# Patient Record
Sex: MALE | Race: WHITE | NOT HISPANIC OR LATINO | Employment: FULL TIME | URBAN - METROPOLITAN AREA
[De-identification: names, ages, dates, MRNs, and addresses within clinical notes are randomized per-mention and may not be internally consistent; named-entity substitution may affect disease eponyms.]

---

## 2021-09-02 ENCOUNTER — APPOINTMENT (OUTPATIENT)
Dept: URGENT CARE | Facility: CLINIC | Age: 26
End: 2021-09-02
Payer: OTHER GOVERNMENT

## 2021-09-10 ENCOUNTER — E-VISIT (OUTPATIENT)
Dept: URGENT CARE | Facility: URGENT CARE | Age: 26
End: 2021-09-10
Payer: OTHER GOVERNMENT

## 2021-09-10 DIAGNOSIS — B07.0 PLANTAR WARTS: Primary | ICD-10-CM

## 2021-09-11 NOTE — PATIENT INSTRUCTIONS
Dear Chencho Beckman    It looks like your foot skin problem could be plantar warts. Plantar warts are caused by a virus. They usually need to be treated with a liquid nitrogen spray that is only available in the clinic. Please make an appointment to see family practice.    Thanks for choosing us as your health care partner,    Mattie Del Rosario PA-C  Dear Chencho Beckman?     After reviewing your responses, I am unable to make a diagnosis that can be treated online.    You will not be charged for this eVisit.     We are dedicated to helping you achieve your best health and would like to see you in one of our many clinic locations - a primary care provider would be ideal for your concern.    Please use [employer] to schedule a visit with a provider or call [phone]) to schedule at any of our locations.    Thanks for choosing?us?as your health care partner,?   ?   Mattie Del Rosario PA-C?

## 2021-09-28 ENCOUNTER — TELEPHONE (OUTPATIENT)
Dept: BEHAVIORAL HEALTH | Facility: CLINIC | Age: 26
End: 2021-09-28

## 2021-09-28 NOTE — TELEPHONE ENCOUNTER
Tried calling the pt to check him in for his 8am [facility] video visit and he did not answer. Writer left a vm requesting a call back.

## 2021-10-05 ENCOUNTER — HOSPITAL ENCOUNTER (OUTPATIENT)
Dept: BEHAVIORAL HEALTH | Facility: CLINIC | Age: 26
End: 2021-10-05
Attending: FAMILY MEDICINE
Payer: OTHER GOVERNMENT

## 2021-10-05 PROCEDURE — 999N000216 HC STATISTIC ADULT CD FACE TO FACE-NO CHRG: Mod: GT | Performed by: COUNSELOR

## 2021-10-05 ASSESSMENT — ANXIETY QUESTIONNAIRES
7. FEELING AFRAID AS IF SOMETHING AWFUL MIGHT HAPPEN: NOT AT ALL
GAD7 TOTAL SCORE: 2
6. BECOMING EASILY ANNOYED OR IRRITABLE: NOT AT ALL
GAD7 TOTAL SCORE: 2
7. FEELING AFRAID AS IF SOMETHING AWFUL MIGHT HAPPEN: NOT AT ALL
4. TROUBLE RELAXING: NOT AT ALL
GAD7 TOTAL SCORE: 2
1. FEELING NERVOUS, ANXIOUS, OR ON EDGE: NOT AT ALL
3. WORRYING TOO MUCH ABOUT DIFFERENT THINGS: SEVERAL DAYS
2. NOT BEING ABLE TO STOP OR CONTROL WORRYING: NOT AT ALL
8. IF YOU CHECKED OFF ANY PROBLEMS, HOW DIFFICULT HAVE THESE MADE IT FOR YOU TO DO YOUR WORK, TAKE CARE OF THINGS AT HOME, OR GET ALONG WITH OTHER PEOPLE?: NOT DIFFICULT AT ALL
5. BEING SO RESTLESS THAT IT IS HARD TO SIT STILL: SEVERAL DAYS

## 2021-10-05 ASSESSMENT — PATIENT HEALTH QUESTIONNAIRE - PHQ9
SUM OF ALL RESPONSES TO PHQ QUESTIONS 1-9: 0
SUM OF ALL RESPONSES TO PHQ QUESTIONS 1-9: 0
10. IF YOU CHECKED OFF ANY PROBLEMS, HOW DIFFICULT HAVE THESE PROBLEMS MADE IT FOR YOU TO DO YOUR WORK, TAKE CARE OF THINGS AT HOME, OR GET ALONG WITH OTHER PEOPLE: NOT DIFFICULT AT ALL
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
## 2021-10-06 ASSESSMENT — PATIENT HEALTH QUESTIONNAIRE - PHQ9: SUM OF ALL RESPONSES TO PHQ QUESTIONS 1-9: 0

## 2021-10-06 ASSESSMENT — ANXIETY QUESTIONNAIRES: GAD7 TOTAL SCORE: 2

## 2021-10-17 ENCOUNTER — HEALTH MAINTENANCE LETTER (OUTPATIENT)
Age: 26
End: 2021-10-17

## 2021-10-29 ENCOUNTER — E-VISIT (OUTPATIENT)
Dept: URGENT CARE | Facility: CLINIC | Age: 26
End: 2021-10-29
Payer: OTHER GOVERNMENT

## 2021-10-29 DIAGNOSIS — B35.4 TINEA CORPORIS: Primary | ICD-10-CM

## 2021-10-29 PROCEDURE — 99421 OL DIG E/M SVC 5-10 MIN: CPT | Performed by: PHYSICIAN ASSISTANT

## 2021-10-29 NOTE — PATIENT INSTRUCTIONS
"  Dear Chencho Beckman    After reviewing your responses, I've been able to diagnose you with \"tinea\" which is a common skin condition caused by a fungal infection. There are many common names for this depending on where it is located and it's appearance (jock itch, athlete's foot, ringworm, etc).  Based on your responses, I have prescribed lamisil to treat this. Please follow the instructions on the medication. If you experience irritation of your skin, new rash, or any other new symptoms, you should stop using this medication and contact your primary care provider.  If this treatment does not work for you in 2 weeks or after completing treatment, please plan to follow-up with your primary care provider to evaluate in-person.  Self-care:  Wash all items that come into contact with infected skin: Wash all towels, clothes, and bedding in hot water. Use laundry soap. Clean shower stalls, mats, and floors with a germ-killing or fungus-killing .   Do not share personal items: Do not share towels, brushes, elena, or hair accessories.    Keep your skin, hair, and nails clean and dry: Bathe every day, and dry your skin before you put medicine on the infected area. Wash your hands often. Do not scratch your sores. This may cause the infection to spread.   Avoid infected pets: A patch of missing fur is a sign of infection in a pet. Take your infected pet to a  for treatment.  Contact your primary healthcare provider if:  You have a fever.    Your infection continues to spread after 7 days of treatment.   Your rash is not gone in 2 weeks.   The area around your rash becomes red, warm, tender, swollen, or smells bad.   You have questions or concerns about your condition or care.    Thanks for choosing?us?as your health care partner,    Lulu Ruiz PA-C  "

## 2021-12-06 ENCOUNTER — APPOINTMENT (OUTPATIENT)
Dept: URGENT CARE | Facility: CLINIC | Age: 26
End: 2021-12-06
Payer: OTHER GOVERNMENT

## 2021-12-11 ENCOUNTER — E-VISIT (OUTPATIENT)
Dept: INTERNAL MEDICINE | Facility: CLINIC | Age: 26
End: 2021-12-11
Payer: OTHER GOVERNMENT

## 2021-12-11 DIAGNOSIS — B35.4 TINEA CORPORIS: Primary | ICD-10-CM

## 2021-12-11 PROCEDURE — 99207 PR NO BILLABLE SERVICE THIS VISIT: CPT | Performed by: PHYSICIAN ASSISTANT

## 2021-12-11 RX ORDER — KETOCONAZOLE 20 MG/G
CREAM TOPICAL 2 TIMES DAILY
Qty: 60 G | Refills: 0 | Status: SHIPPED | OUTPATIENT
Start: 2021-12-11 | End: 2021-12-25

## 2021-12-28 ENCOUNTER — E-VISIT (OUTPATIENT)
Dept: URGENT CARE | Facility: URGENT CARE | Age: 26
End: 2021-12-28
Payer: OTHER GOVERNMENT

## 2021-12-28 ENCOUNTER — E-VISIT (OUTPATIENT)
Dept: URGENT CARE | Facility: URGENT CARE | Age: 26
End: 2021-12-28

## 2021-12-28 ENCOUNTER — NURSE TRIAGE (OUTPATIENT)
Dept: NURSING | Facility: CLINIC | Age: 26
End: 2021-12-28

## 2021-12-28 DIAGNOSIS — L20.9 ATOPIC DERMATITIS, UNSPECIFIED TYPE: Primary | ICD-10-CM

## 2021-12-28 DIAGNOSIS — L24.9 IRRITANT CONTACT DERMATITIS, UNSPECIFIED TRIGGER: Primary | ICD-10-CM

## 2021-12-28 PROCEDURE — 99421 OL DIG E/M SVC 5-10 MIN: CPT | Performed by: PHYSICIAN ASSISTANT

## 2021-12-28 PROCEDURE — 99207 PR NON-BILLABLE SERV PER CHARTING: CPT | Performed by: PHYSICIAN ASSISTANT

## 2021-12-28 RX ORDER — TRIAMCINOLONE ACETONIDE 1 MG/G
OINTMENT TOPICAL 2 TIMES DAILY
Qty: 30 G | Refills: 1 | Status: SHIPPED | OUTPATIENT
Start: 2021-12-28 | End: 2022-03-22

## 2021-12-28 RX ORDER — DESONIDE 0.5 MG/G
CREAM TOPICAL 2 TIMES DAILY
Qty: 60 G | Refills: 1 | Status: SHIPPED | OUTPATIENT
Start: 2021-12-28 | End: 2022-03-22

## 2021-12-29 ENCOUNTER — OFFICE VISIT (OUTPATIENT)
Dept: FAMILY MEDICINE | Facility: CLINIC | Age: 26
End: 2021-12-29
Payer: OTHER GOVERNMENT

## 2021-12-29 VITALS
DIASTOLIC BLOOD PRESSURE: 74 MMHG | HEART RATE: 82 BPM | SYSTOLIC BLOOD PRESSURE: 130 MMHG | RESPIRATION RATE: 16 BRPM | TEMPERATURE: 98.4 F | OXYGEN SATURATION: 96 % | WEIGHT: 139 LBS

## 2021-12-29 DIAGNOSIS — B35.9 RINGWORM: Primary | ICD-10-CM

## 2021-12-29 PROCEDURE — 99213 OFFICE O/P EST LOW 20 MIN: CPT | Performed by: EMERGENCY MEDICINE

## 2021-12-29 RX ORDER — TERBINAFINE HYDROCHLORIDE 250 MG/1
250 TABLET ORAL DAILY
Qty: 7 TABLET | Refills: 1 | Status: SHIPPED | OUTPATIENT
Start: 2021-12-29 | End: 2022-01-05

## 2021-12-29 NOTE — PROGRESS NOTES
Impression:  Ringworm left index finger unresponsive to topical treatment    Plan:  Terbinafine 250 mg daily for 7 days, repeat 1 additional week if not effective, follow-up with primary care as needed      Chief Complaint:  Patient presents with:  Rash: possible ring worm left index finger has been on going has been given several creams never fully goes away looking for oral medication         HPI:   Chencho Beckman is a 26 year old male who presents to this clinic for the evaluation of ringworm on the left index finger.  Patient complains of several weeks of a rash on the dorsal aspect of the proximal phalanx of the left index finger.  He was diagnosed with ringworm and has been given 2 different types of topical treatment but neither 1 has resulted in a response and he requests an oral medication.  No other rash.  No other medical problems      PMH:   No past medical history on file.  No past surgical history on file.      ROS:  All other systems negative    Meds:    Current Outpatient Medications:      terbinafine (LAMISIL) 1 % external cream, Apply topically 2 times daily, Disp: 30 g, Rfl: 1     desonide (DESOWEN) 0.05 % external cream, Apply topically 2 times daily (Patient not taking: Reported on 12/29/2021), Disp: 60 g, Rfl: 1     triamcinolone (KENALOG) 0.1 % external ointment, Apply topically 2 times daily (Patient not taking: Reported on 12/29/2021), Disp: 30 g, Rfl: 1        Social:  Social History     Socioeconomic History     Marital status: Single     Spouse name: Not on file     Number of children: Not on file     Years of education: Not on file     Highest education level: Not on file   Occupational History     Not on file   Tobacco Use     Smoking status: Never Smoker     Smokeless tobacco: Never Used   Substance and Sexual Activity     Alcohol use: Not on file     Drug use: Not on file     Sexual activity: Not on file   Other Topics Concern     Not on file   Social History Narrative     Not on  file     Social Determinants of Health     Financial Resource Strain: Not on file   Food Insecurity: Not on file   Transportation Needs: Not on file   Physical Activity: Not on file   Stress: Not on file   Social Connections: Not on file   Intimate Partner Violence: Not on file   Housing Stability: Not on file         Physical Exam:  Vitals:    12/29/21 1459   BP: 130/74   Pulse: 82   Resp: 16   Temp: 98.4  F (36.9  C)   TempSrc: Oral   SpO2: 96%   Weight: 63 kg (139 lb)      Circular area of vague erythema with central clearing over the dorsal aspect of the left index finger over the proximal phalanx.  No other lesions      Results:    No results found for this or any previous visit (from the past 24 hour(s)).           Charles David MD

## 2021-12-29 NOTE — PATIENT INSTRUCTIONS
Patient Education     Ringworm of the Skin     Ringworm is a fungal infection of the skin. Despite the name, a worm doesn't cause it. The cause of ringworm is a fungus that infects the outer layers of the skin.  The medical term for ringworm is tinea. It can affect most parts of your body, although it seems to do better in moist areas of the body and around hair. It can be on almost any part of your body, including:    Arms, hands, legs, chest, feet, and back    Scalp    Beard    Groin    Between the toes  Depending on where it is located, sometimes the name changes. For example:    Tinea capitis (scalp)    Tinea cruris (groin)    Tinea corporis (body)    Tinea pedis (feet)  Causes  Ringworm is very common all over the world, including the [place] It can take less than 1 week up to 2 weeks before you develop the infection after being exposed. So, you may not figure out the exact cause.  It's spread through direct contact with:    An infected person or animal    Infected soil, or objects such as towels, clothing, and elena  Symptoms  At first you might not notice ringworm. Or you may just see a small, red, often raised itchy spot or pimple. Sometimes there may only be one spot. At other times there may be several. Ringworm can look slightly different on different parts of the body, but there are some things are always present:    Irregular, round, oval or ring-shaped, which is why it's called ringworm    Clearer or lighter color at the center, since it spreads from the center of the spot outward    Red or inflamed look    Raised    Itchy    Scaly, dry, or flaky  Home care  Follow these tips to help care for yourself at home:    Leave it alone. Don't scratch at the rash or pick it. This can increase the chance of infection and scarring.    Take medicine as prescribed. If you were prescribed a cream, apply it exactly as directed. Make sure to put the cream not just on the rash, but also on the skin 1 or 2 inches around  it. Medicine by mouth is sometimes needed, particularly for ringworm on the scalp. Take it as directed and until your healthcare provider says to stop. Some ringworm creams are now available without a prescription (over the counter). Talk with your healthcare provider about these, as they may be just as effective but less expensive than prescription medicines in some cases.    Keep it from spreading to others.  Untreated ringworm of the skin is contagious by skin-to-skin contact. An affected child may return to school 2 days after treatment has started.  Prevention  To some degree, prevention depends on what part of your body was affected. In general, the following good hygiene can help.    Clean up after you get dirty or sweaty, or after using a locker room.    When possible, don t share elena and brushes.    Avoid having your skin and feet wet or damp for long periods.    Wear clean, loose-fitting underwear.  Follow-up care  Follow up with your healthcare provider as advised by our staff if the rash does not improve after 10 days of treatment or if the rash spreads to other areas of the body.  When to seek medical care  Call your healthcare provider right away if any of these occur:    Redness around the rash gets worse    Fluid drains from the rash    Fever of 100.4 F (38 C) or higher, or as directed by your healthcare provider  Olamide last reviewed this educational content on 8/1/2019 2000-2021 [employer]. All rights reserved. This information is not intended as a substitute for professional medical care. Always follow your healthcare professional's instructions.

## 2021-12-29 NOTE — TELEPHONE ENCOUNTER
Patient called.  He has had 3 evisits due to ringworm.  Patient states he has tried the cream and it is not helping.  He is requesting a oral medication.  Plan will be for the patient to go to the walk in clinic in [place] today.  Address given to patient of [facility] walk in [facility].        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

## 2022-01-13 ENCOUNTER — VIRTUAL VISIT (OUTPATIENT)
Dept: FAMILY MEDICINE | Facility: CLINIC | Age: 27
End: 2022-01-13
Payer: OTHER GOVERNMENT

## 2022-01-13 DIAGNOSIS — L98.9 SKIN LESION: Primary | ICD-10-CM

## 2022-01-13 PROCEDURE — 99203 OFFICE O/P NEW LOW 30 MIN: CPT | Mod: 95 | Performed by: FAMILY MEDICINE

## 2022-01-13 NOTE — PROGRESS NOTES
Ty is a 26 year old who is being evaluated via a billable video visit.      How would you like to obtain your AVS? MyChart  If the video visit is dropped, the invitation should be resent by: Text to cell phone: [phone]  Will anyone else be joining your video visit? No      Video Start Time: 3:13 PM    Assessment & Plan     Skin lesion  Patient presents to clinic several weeks after discovery of an erythematous and pruritic lesion/rash on his index finger not responsive to topical ketoconazole and oral terbinafine. I do not suspect that this is ringworm given the duration of symptoms resistant to treatment. Would consider psoriasis as an alternative diagnosis given the flares after swimming. Recommended stopping antifungal treatment, watching waiting, treatment with otc hydrocortisone if it flares again, and referral to derm if that is insufficient.            No follow-ups on file.    Maria Luisa Britt MD  [facility]    Subjective   Ty is a 26 year old who presents for the following health issues     HPI     Follow up on skin condition:  - patient is concerned he has ringworm  - flares with swimming and hot water use  - never had anything like this before  - patient has already been taking oral terbinafine and topical ketoconazole for several weeks        Objective           Vitals:  No vitals were obtained today due to virtual visit.    Physical Exam   Gen: NAD  Psych: Normal affect, no hallucinations or delusions, not tearful  Skin: challenging to appreciate in this virtual medium, no obvious scaling or demarcation for the lesion          Video-Visit Details    Type of service:  Video Visit    Video End Time:3:23 PM    Originating Location (pt. Location): Home    Distant Location (provider location):  [facility]     Platform used for Video Visit: F[employer]io

## 2022-01-14 DIAGNOSIS — L98.9 SKIN LESION: Primary | ICD-10-CM

## 2022-01-14 NOTE — TELEPHONE ENCOUNTER
I can help place referral to dermatology for further evaluation if needed, but cannot recommend that we resend a topical antifungal.  Patient was seen by primary care yesterday and there is low concern for fungal involvement.  Also, walk-in care clinic does not typically perform refills.

## 2022-01-14 NOTE — TELEPHONE ENCOUNTER
"Pt calling to schedule a dermatology appt for potential ringworm following a 12/29/21 visit with Dr. David and was transferred to triage. Informed pt that writer does not see a dermatology referral, but can pend this order and send it to physician.      Pt also states he has requested a refill of terbinafine from the clinic but that they have been \"unresponsive.\"      Dermatology referral pended.  Terbinafine refill pended.  Forwarded to Joann and [facility].    Caller asked to be sent a copy of this note via 2 Minutes and this was done.    Marci PAGE RN  [facility]            "

## 2022-03-21 ENCOUNTER — DOCUMENTATION ONLY (OUTPATIENT)
Dept: LAB | Facility: CLINIC | Age: 27
End: 2022-03-21
Payer: OTHER GOVERNMENT

## 2022-03-21 NOTE — PROGRESS NOTES
Chencho Beckman has an upcoming lab appointment:    Future Appointments   Date Time Provider Department Center   3/22/2022  8:45 AM [facility]     Patient is scheduled for the following lab(s): Blood work. Patient's note- Food Allergy/Senitivity; Abdominal pain, indigestion, and heart rate spike after eating; Malaise, weakness, appetite swings.    There is no order available. Please review and place either future orders or HMPO (Review of Health Maintenance Protocol Orders), as appropriate.    Health Maintenance Due   Topic     ANNUAL REVIEW OF HM ORDERS      HIV SCREENING      HEPATITIS C SCREENING      Luis Nix

## 2022-03-22 ENCOUNTER — OFFICE VISIT (OUTPATIENT)
Dept: FAMILY MEDICINE | Facility: CLINIC | Age: 27
End: 2022-03-22
Payer: OTHER GOVERNMENT

## 2022-03-22 ENCOUNTER — E-VISIT (OUTPATIENT)
Dept: URGENT CARE | Facility: URGENT CARE | Age: 27
End: 2022-03-22
Payer: OTHER GOVERNMENT

## 2022-03-22 VITALS
SYSTOLIC BLOOD PRESSURE: 121 MMHG | OXYGEN SATURATION: 100 % | WEIGHT: 138.8 LBS | DIASTOLIC BLOOD PRESSURE: 62 MMHG | BODY MASS INDEX: 23.7 KG/M2 | HEIGHT: 64 IN | TEMPERATURE: 98.8 F | HEART RATE: 80 BPM

## 2022-03-22 DIAGNOSIS — F41.9 ANXIETY: ICD-10-CM

## 2022-03-22 DIAGNOSIS — R14.0 BLOATING: Primary | ICD-10-CM

## 2022-03-22 DIAGNOSIS — Z11.4 SCREENING FOR HIV (HUMAN IMMUNODEFICIENCY VIRUS): ICD-10-CM

## 2022-03-22 DIAGNOSIS — Z11.59 NEED FOR HEPATITIS C SCREENING TEST: ICD-10-CM

## 2022-03-22 DIAGNOSIS — R53.1 WEAKNESS: Primary | ICD-10-CM

## 2022-03-22 DIAGNOSIS — Z23 NEED FOR VACCINATION: ICD-10-CM

## 2022-03-22 LAB
ALBUMIN SERPL-MCNC: 4.2 G/DL (ref 3.4–5)
ALBUMIN UR-MCNC: NEGATIVE MG/DL
ALP SERPL-CCNC: 69 U/L (ref 40–150)
ALT SERPL W P-5'-P-CCNC: 37 U/L (ref 0–70)
ANION GAP SERPL CALCULATED.3IONS-SCNC: 1 MMOL/L (ref 3–14)
APPEARANCE UR: CLEAR
AST SERPL W P-5'-P-CCNC: 24 U/L (ref 0–45)
BILIRUB SERPL-MCNC: 0.6 MG/DL (ref 0.2–1.3)
BILIRUB UR QL STRIP: NEGATIVE
BUN SERPL-MCNC: 14 MG/DL (ref 7–30)
CALCIUM SERPL-MCNC: 9.1 MG/DL (ref 8.5–10.1)
CHLORIDE BLD-SCNC: 106 MMOL/L (ref 94–109)
CO2 SERPL-SCNC: 31 MMOL/L (ref 20–32)
COLOR UR AUTO: YELLOW
CREAT SERPL-MCNC: 0.96 MG/DL (ref 0.66–1.25)
ERYTHROCYTE [DISTWIDTH] IN BLOOD BY AUTOMATED COUNT: 11.9 % (ref 10–15)
GFR SERPL CREATININE-BSD FRML MDRD: >90 ML/MIN/1.73M2
GLUCOSE BLD-MCNC: 108 MG/DL (ref 70–99)
GLUCOSE UR STRIP-MCNC: NEGATIVE MG/DL
HCT VFR BLD AUTO: 46.7 % (ref 40–53)
HGB BLD-MCNC: 16.7 G/DL (ref 13.3–17.7)
HGB UR QL STRIP: NEGATIVE
KETONES UR STRIP-MCNC: NEGATIVE MG/DL
LEUKOCYTE ESTERASE UR QL STRIP: NEGATIVE
MCH RBC QN AUTO: 32.1 PG (ref 26.5–33)
MCHC RBC AUTO-ENTMCNC: 35.8 G/DL (ref 31.5–36.5)
MCV RBC AUTO: 90 FL (ref 78–100)
NITRATE UR QL: NEGATIVE
PH UR STRIP: 7.5 [PH] (ref 5–7)
PLATELET # BLD AUTO: 199 10E3/UL (ref 150–450)
POTASSIUM BLD-SCNC: 4.4 MMOL/L (ref 3.4–5.3)
PROT SERPL-MCNC: 7.3 G/DL (ref 6.8–8.8)
RBC # BLD AUTO: 5.21 10E6/UL (ref 4.4–5.9)
SODIUM SERPL-SCNC: 138 MMOL/L (ref 133–144)
SP GR UR STRIP: 1.02 (ref 1–1.03)
UROBILINOGEN UR STRIP-ACNC: 0.2 E.U./DL
WBC # BLD AUTO: 4.2 10E3/UL (ref 4–11)

## 2022-03-22 PROCEDURE — 85027 COMPLETE CBC AUTOMATED: CPT | Performed by: FAMILY MEDICINE

## 2022-03-22 PROCEDURE — 80053 COMPREHEN METABOLIC PANEL: CPT | Performed by: FAMILY MEDICINE

## 2022-03-22 PROCEDURE — 81003 URINALYSIS AUTO W/O SCOPE: CPT | Performed by: FAMILY MEDICINE

## 2022-03-22 PROCEDURE — 99214 OFFICE O/P EST MOD 30 MIN: CPT | Performed by: FAMILY MEDICINE

## 2022-03-22 PROCEDURE — 86803 HEPATITIS C AB TEST: CPT | Performed by: FAMILY MEDICINE

## 2022-03-22 PROCEDURE — 36415 COLL VENOUS BLD VENIPUNCTURE: CPT | Performed by: FAMILY MEDICINE

## 2022-03-22 PROCEDURE — 87389 HIV-1 AG W/HIV-1&-2 AB AG IA: CPT | Performed by: FAMILY MEDICINE

## 2022-03-22 NOTE — PROGRESS NOTES
"  Assessment & Plan      New patient to me   Repetitive, perseverating, interrupting me  Very focused on needing more lab testing - doing an out of pocket internet work up \"male panel\"   Agrees that he is highly anxious but gets very annoyed when I suggest meds to help. \"of course you want to throw meds at the problem\"  Says that meds have never helped anxiety/depression.   Wants me to order food sensitivity testing today       Bloating  Vague symptoms. No red flag symptoms at all. Tried to reassure him but he seems frustrated that I won't order \"food sensitivity\" panels. Will check basic abd labs and consider further work up if there is anything that shows up in the labwork.   - Comprehensive metabolic panel (BMP + Alb, Alk Phos, ALT, AST, Total. Bili, TP); Future  - CBC with platelets; Future  - UA Macro with Reflex to Micro and Culture - lab collect; Future  - Adult Allergy/Asthma Referral; Future    Anxiety   Refuses any help with this  Says meds don't work     Screening for HIV (human immunodeficiency virus)  Discussed   - HIV Antigen Antibody Combo; Future    Need for hepatitis C screening test  Discussed   - Hepatitis C Screen Reflex to HCV RNA Quant and Genotype; Future      Return in about 4 weeks (around 4/19/2022) for with specialist or sooner if needed.    Chacha Olvera MD  [facility] P[facility] Crawford is a 26 year old who presents for the following health issues    New patient       Answers for HPI/ROS submitted by the patient on 3/22/2022  How many servings of fruits and vegetables do you eat daily?: 2-3  On average, how many sweetened beverages do you drink each day (Examples: soda, juice, sweet tea, etc.  Do NOT count diet or artificially sweetened beverages)?: 0  How many minutes a day do you exercise enough to make your heart beat faster?: 30 to 60  How many days a week do you exercise enough to make your heart beat faster?: 5  How many days per week do you miss taking " "your medication?: 0        What is the reason for your visit today?: Gut issues  When did your symptoms begin?: 5 days ago  What are your symptoms?: Lower abdominal pain/burning, bloating/distended gut, loss of appetite,  indigestion  How would you describe these symptoms?: Mild  Are your symptoms:: Staying the same  Have you had these symptoms before?: Yes  Have you tried or received treatment for these symptoms before?: No  Is there anything that makes you feel worse?: Eating  Is there anything that makes you feel better?: Very restricted diet      Mild bloating symptoms started 5 days ago  Then had to do a large amount of physical activity the next day and felt worse afterwards    Then reports that lower abdominal bloating and mild discomfort started two days ago   - stable   Only with eating   Bloated in the lower abdomen   Some diarrhea after his intense exercise, none now   Normal stools - no black/tarry/blood stools   Appetite is decreased   No n/v    Refuses std testing - denies symptoms     Has lactose intolerance - takes lactase     Says he has a gluten sensitivities - eating normally until this episode occurred     Worried about norovirus - has a fear of vomiting  A friend's girlfriend recently had it so he is worried that he might have it.     Says \"YES\" he has anxiety    Sweaty hands and racing heart       Review of systems:   No fever chills  No current chest pain  No shortness of breath/cough  No n/v  No diarrhea/constipation   No black/tarry/bloody stools.   No dysuria/urgency/frequency      Doing a \"male panel\" - a full set of labs from an internet website site - says it costs thousands of dollars but does a better job at looking at the \"whole body\" than we do in clinic.     References some study about how most women like 10% of the men     Taking ajith, turmeric, other things to help his immune system     Review of his chart :     No medical diagnoses, per his report.   In epic, has been diagnosed " "with depression, and has other visits for abd pain.     1/2020 - abd pain visit - sent to GI  - I don't see a visit     Review of Systems   Constitutional, HEENT, cardiovascular, pulmonary, gi and gu systems are negative, except as otherwise noted.      Objective    /62   Pulse 80   Temp 98.8  F (37.1  C) (Tympanic)   Ht 1.632 m (5' 4.25\")   Wt 63 kg (138 lb 12.8 oz)   SpO2 100%   BMI 23.64 kg/m    Body mass index is 23.64 kg/m .  Physical Exam   GENERAL: healthy, alert and no distress, minimal eye contact.   EYES: Eyes grossly normal to inspection, PERRL and conjunctivae and sclerae normal  HENT: ear canals and TM's normal, nose and mouth without ulcers or lesions  NECK: no adenopathy, no asymmetry, masses, or scars and thyroid normal to palpation  RESP: lungs clear to auscultation - no rales, rhonchi or wheezes  CV: regular rate and rhythm, normal S1 S2, no S3 or S4, no murmur, click or rub, no peripheral edema and peripheral pulses strong  ABDOMEN: soft, nontender, no hepatosplenomegaly, no masses and bowel sounds normal  MS: no gross musculoskeletal defects noted, no edema  SKIN: no suspicious lesions or rashes  NEURO: Normal strength and tone, mentation intact and speech normal  PSYCH: mentation appears somewhat compulsive and perseverating around health/wellness/fitness          "

## 2022-03-22 NOTE — PATIENT INSTRUCTIONS
Dear Chencho Beckman    I would recommend you being seen at the primary clinic or urgent care for further evaluation and treatment     Thanks for choosing us as your health care partner,    Karishma Roman MD

## 2022-03-23 LAB
HCV AB SERPL QL IA: NONREACTIVE
HIV 1+2 AB+HIV1 P24 AG SERPL QL IA: NONREACTIVE

## 2022-10-03 ENCOUNTER — HEALTH MAINTENANCE LETTER (OUTPATIENT)
Age: 27
End: 2022-10-03

## 2023-02-11 ENCOUNTER — HEALTH MAINTENANCE LETTER (OUTPATIENT)
Age: 28
End: 2023-02-11

## 2023-04-05 ENCOUNTER — E-VISIT (OUTPATIENT)
Dept: FAMILY MEDICINE | Facility: CLINIC | Age: 28
End: 2023-04-05

## 2023-04-05 DIAGNOSIS — F41.9 ANXIETY: Primary | ICD-10-CM

## 2023-04-05 PROCEDURE — 99207 PR NON-BILLABLE SERV PER CHARTING: CPT | Performed by: FAMILY MEDICINE

## 2023-04-05 ASSESSMENT — ANXIETY QUESTIONNAIRES
GAD7 TOTAL SCORE: 3
6. BECOMING EASILY ANNOYED OR IRRITABLE: SEVERAL DAYS
1. FEELING NERVOUS, ANXIOUS, OR ON EDGE: SEVERAL DAYS
8. IF YOU CHECKED OFF ANY PROBLEMS, HOW DIFFICULT HAVE THESE MADE IT FOR YOU TO DO YOUR WORK, TAKE CARE OF THINGS AT HOME, OR GET ALONG WITH OTHER PEOPLE?: SOMEWHAT DIFFICULT
3. WORRYING TOO MUCH ABOUT DIFFERENT THINGS: NOT AT ALL
4. TROUBLE RELAXING: NOT AT ALL
7. FEELING AFRAID AS IF SOMETHING AWFUL MIGHT HAPPEN: SEVERAL DAYS
7. FEELING AFRAID AS IF SOMETHING AWFUL MIGHT HAPPEN: SEVERAL DAYS
2. NOT BEING ABLE TO STOP OR CONTROL WORRYING: NOT AT ALL
5. BEING SO RESTLESS THAT IT IS HARD TO SIT STILL: NOT AT ALL
GAD7 TOTAL SCORE: 3
GAD7 TOTAL SCORE: 3

## 2023-04-05 ASSESSMENT — PATIENT HEALTH QUESTIONNAIRE - PHQ9
10. IF YOU CHECKED OFF ANY PROBLEMS, HOW DIFFICULT HAVE THESE PROBLEMS MADE IT FOR YOU TO DO YOUR WORK, TAKE CARE OF THINGS AT HOME, OR GET ALONG WITH OTHER PEOPLE: SOMEWHAT DIFFICULT
SUM OF ALL RESPONSES TO PHQ QUESTIONS 1-9: 13
SUM OF ALL RESPONSES TO PHQ QUESTIONS 1-9: 13

## 2023-04-05 NOTE — TELEPHONE ENCOUNTER
DIAGNOSIS: Both Elbows   APPOINTMENT DATE: 4.11.23   NOTES STATUS DETAILS   OFFICE NOTE from referring provider Internal Self referred

## 2023-04-06 ASSESSMENT — ANXIETY QUESTIONNAIRES: GAD7 TOTAL SCORE: 3

## 2023-04-06 ASSESSMENT — PATIENT HEALTH QUESTIONNAIRE - PHQ9: SUM OF ALL RESPONSES TO PHQ QUESTIONS 1-9: 13

## 2023-04-06 NOTE — TELEPHONE ENCOUNTER
Call placed to patient  Relayed Dr. Olvera's message    Patient denies active plan to self harm  States he feels safe in his home  Good support from friends - has a complicated relationship with family     Patient wondering if symptoms are related to lab levels being off and would like labs checked    Patient transferred to schedule an in person visit at a clinic closet to him   Patient verbalized understanding  No further questions/concerns    Joshua Cisneros RN

## 2023-04-06 NOTE — TELEPHONE ENCOUNTER
Provider E-Visit time total (minutes): 6    Clinic RN - please call patient. He reported daily self harm thoughts on his evisit questions. Please assess for safety.     He needs an in person appointment. I have only seen him once before.  Please see if there are any appts in the next couple days with any provider/clinic he is willing to see and schedule him.    Thanks, DEBI Olvera MD

## 2023-04-11 ENCOUNTER — PRE VISIT (OUTPATIENT)
Dept: ORTHOPEDICS | Facility: CLINIC | Age: 28
End: 2023-04-11
Payer: OTHER GOVERNMENT

## 2023-04-11 ENCOUNTER — OFFICE VISIT (OUTPATIENT)
Dept: ORTHOPEDICS | Facility: CLINIC | Age: 28
End: 2023-04-11
Payer: OTHER GOVERNMENT

## 2023-04-11 DIAGNOSIS — M25.522 BILATERAL ELBOW JOINT PAIN: Primary | ICD-10-CM

## 2023-04-11 DIAGNOSIS — M25.521 BILATERAL ELBOW JOINT PAIN: Primary | ICD-10-CM

## 2023-04-11 PROCEDURE — 99203 OFFICE O/P NEW LOW 30 MIN: CPT | Performed by: FAMILY MEDICINE

## 2023-04-11 NOTE — PATIENT INSTRUCTIONS
"Medial Epicondylitis    Golfer s Elbow Instructions    MEDIAL EPICONDYLITIS (AKA GOLFER'S ELBOW):  -pain on the inside of elbow worsened with any gripping or lifting activity  -weakness of   -aching or burning pain  -Usually there is not any numbness or tingling involved (please let us know if this is happening as it may be something else)    TREATMENT:  -Avoid aggravating activities until pain free at rest and with exercises. Then return slowly with pain being your guide. IF IT HURTS, DO NOT DO IT!  -wrist brace allows the muscles to relax in neutral position. The muscles that extend and rotate your wrist are attached at the lateral elbow.It is these tendons that are painful so the wrist brace protects them (wear brace 24hours/day until pain free then may wean out except for activities)  - \"chopat\" elbow strap relieves pressure on the tendon attachment but does squeeze muscle so may initially hurt. Often helpful once resuming activities and not using wrist brace.  -Ice 10-15 minutes after activity. (or ice cup massage 5-7 min)  -cross-friction massage (rub painful area)  -Take Aleve (220mg) 2 tabs twice daily with food for 14 days to decrease inflammation, then as needed for pain.    HOME EXERCISES:  -Perform exercises as instructed in handout:  Goal:  2 sets of 20 for each strength exercise   1-2 times per day   5 days a week   Stretch after strengthening- Hold stretches for 30 secs, repeat 2-3 times   Avoid stretching through pain  -An excellent therapy program for epicondylitis is called \"Reverse Vince Twist\"  - you may do this through formal therapy or on your own.  -To do at home, purchase a flex  bar by [employer] online. there are different strengths so adjust according to your fitness level and pain. The bar may be purchased online as well at sites such as amazon.  - Search online for \"Reverse Vince twist elbow exercises\" and you will find videos on how to perform.  -Follow-up in 6 weeks or sooner " if not improved, unable to do exercises do to pain, or if further concern.  -If not improving, we need to make sure the diagnosis is correct. Occasionally, elbow pain is due to nerve irritation.    -Once diagnosis is confirmed, we may consider nitroglycerin patch (see below for info), formal occupational therapy if not started, lidocaine injection to stimulate healing response (tenotomy), or other therapy modalities.  -We will try to avoid cortisone injections as they may delay healing but will consider if pain prevents improvement with other modalities  -rarely do you need surgery to alleviate pain    Other treatment options include:  -consider Active release therapy with a chiropractor.  For more information on ART check www.activerelease.Contrib.  -Therapeutic massage    If problem flares again after resolved, restart brace full-time, restart icing, Aleve, and exercises. If it does not calm down, schedule follow up evaluation.    You may do the stretching exercises right away. You may do the strengthening exercises when stretching is nearly painless.    STRETCHING EXERCISES  Wrist active range of motion, flexion and extension: Bend the wrist of your injured arm forward and back as far as you can. Do 2 sets of 15.    Wrist stretch: Press the back of the hand on your injured side with your other hand to help bend your wrist. Hold for 15 to 30 seconds. Next, stretch the hand back by pressing the fingers in a backward direction. Hold for 15 to 30 seconds. Keep the arm on your injured side straight during this exercise. Do 3 sets.    Forearm pronation and supination: Bend the elbow of your injured arm 90 degrees, keeping your elbow at your side. Turn your palm up and hold for 5 seconds. Then slowly turn your palm down and hold for 5 seconds. Make sure you keep your elbow at your side and bent 90 degrees while you do the exercise. Do 2 sets of 15.    STRENGTHENING EXERCISES  Eccentric wrist flexion: Hold a can or hammer  handle in the hand of your injured side with your palm up. Use the hand on the side that is not injured to bend your wrist up. Then let go of your wrist and use just your injured side to lower the weight slowly back to the starting position. Do 3 sets of 15. Gradually increase the weight you are holding.    Eccentric wrist extension: Hold a soup can or hammer handle in the hand of your injured side with your palm facing down. Use the hand on the side that is not injured to bend your wrist up. Then let go of your wrist and use just your injured side to lower the weight slowly back to the starting position. Do 3 sets of 15. Gradually increase the weight you are holding.     strengthening: Squeeze a soft rubber ball and hold the squeeze for 5 seconds. Do 2 sets of 15.    Forearm pronation and supination strengthening: Hold a soup can or hammer handle in your hand and bend your elbow 90 degrees. Slowly turn your hand so your palm is up and then down. Do 2 sets of 15.    Resisted elbow flexion and extension: Hold a can of soup with your palm up. Slowly bend your elbow so that your hand is coming toward your shoulder. Then lower it slowly so your arm is completely straight. Do 2 sets of 15. Slowly increase the weight you are using.      Developed by "[employer]".  Published by "[employer]".  Copyright  2014 [employer] and/or one of its subsidiaries. All rights reserved.    References

## 2023-04-11 NOTE — LETTER
4/11/2023      RE: Chencho Beckman  [address] [place] [address]     Dear Colleague,    Thank you for referring your patient, Chencho Beckman, to the [facility] SPORTS MEDICINE [facility]. Please see a copy of my visit note below.      [facility] AND SUR[facility]ERY [facility]  SPORTS & ORTHOPEDIC CLINIC VISIT     Apr 11, 2023        ASSESSMENT & PLAN    26 yo with bilateral medial epicondylitis    Reviewed imaging and assessment with patient in detail  Provided with HEP and referred to hand therapy  Discussed activity modifications  Follow up in clinic in 6 weeks    Barber Calderón MD  [facility] SPORTS MEDICINE [facility]    -----  Chief Complaint   Patient presents with    Left Elbow - Pain    Right Elbow - Pain       SUBJECTIVE  Chencho Beckman is a/an 27 year old male who is seen as a self referral for evaluation of  Bilateral elbow pain.     The patient is seen by themselves.  The patient is Right handed    Onset: 1 years(s) ago. Patient describes injury as over training while exercising.   Location of Pain: bilateral medial elbow   Worsened by: pronation and supination, stress on elbow, carrying and lifting   Better with: Rest, Avoidance   Treatments tried: rest/activity avoidance  Associated symptoms: no distal numbness or tingling; denies swelling or warmth    Orthopedic/Surgical history: NO  Social History/Occupation: Office job       REVIEW OF SYSTEMS:  Do you have fever, chills, weight loss? No  Do you have any vision problems? No  Do you have any chest pain or edema? No  Do you have any shortness of breath or wheezing?  No  Do you have stomach problems? No  Do you have any numbness or focal weakness? No  Do you have diabetes? No  Do you have problems with bleeding or clotting? No  Do you have an rashes or other skin lesions? No    OBJECTIVE:  There were no vitals taken for this visit.     General: Alert, pleasant, no distress  Bilateral elbow: warm, well perfused,  SILT throughout     Inspection: no swelling, ecchymosis, deformity     ROM:symmetric without pain     Strength:intact with pain on pronation      Palpation:ttp over medial epicondyle, no ttp over lateral epicondyle,     Special Tests: pain with resisted throwers on right, no laxity with valgus stress      RADIOLOGY:    No imaging this visit           Again, thank you for allowing me to participate in the care of your patient.      Sincerely,    Barber Calderón MD

## 2023-04-11 NOTE — PROGRESS NOTES
[facility] CLINICS AND SURGERY CENTER  SPORTS & ORTHOPEDIC CLINIC VISIT     Apr 11, 2023        ASSESSMENT & PLAN    26 yo with bilateral medial epicondylitis    Reviewed imaging and assessment with patient in detail  Provided with HEP and referred to hand therapy  Discussed activity modifications  Follow up in clinic in 6 weeks    Barber Calderón MD  [facility] SPORTS MEDICINE CLINIC [place]    -----  Chief Complaint   Patient presents with     Left Elbow - Pain     Right Elbow - Pain       SUBJECTIVE  Chencho Beckman is a/an 27 year old male who is seen as a self referral for evaluation of  Bilateral elbow pain.     The patient is seen by themselves.  The patient is Right handed    Onset: 1 years(s) ago. Patient describes injury as over training while exercising.   Location of Pain: bilateral medial elbow   Worsened by: pronation and supination, stress on elbow, carrying and lifting   Better with: Rest, Avoidance   Treatments tried: rest/activity avoidance  Associated symptoms: no distal numbness or tingling; denies swelling or warmth    Orthopedic/Surgical history: NO  Social History/Occupation: Office job       REVIEW OF SYSTEMS:    Do you have fever, chills, weight loss? No    Do you have any vision problems? No    Do you have any chest pain or edema? No    Do you have any shortness of breath or wheezing?  No    Do you have stomach problems? No    Do you have any numbness or focal weakness? No    Do you have diabetes? No    Do you have problems with bleeding or clotting? No    Do you have an rashes or other skin lesions? No    OBJECTIVE:  There were no vitals taken for this visit.     General: Alert, pleasant, no distress  Bilateral elbow: warm, well perfused, SILT throughout     Inspection: no swelling, ecchymosis, deformity     ROM:symmetric without pain     Strength:intact with pain on pronation      Palpation:ttp over medial epicondyle, no ttp over lateral epicondyle,     Special Tests: pain with  resisted throwers on right, no laxity with valgus stress      RADIOLOGY:    No imaging this visit

## 2023-05-22 ENCOUNTER — OFFICE VISIT (OUTPATIENT)
Dept: ORTHOPEDICS | Facility: CLINIC | Age: 28
End: 2023-05-22
Payer: OTHER GOVERNMENT

## 2023-05-22 DIAGNOSIS — M25.521 BILATERAL ELBOW JOINT PAIN: Primary | ICD-10-CM

## 2023-05-22 DIAGNOSIS — M25.522 BILATERAL ELBOW JOINT PAIN: Primary | ICD-10-CM

## 2023-05-22 PROCEDURE — 99213 OFFICE O/P EST LOW 20 MIN: CPT | Performed by: FAMILY MEDICINE

## 2023-05-22 ASSESSMENT — PATIENT HEALTH QUESTIONNAIRE - PHQ9: SUM OF ALL RESPONSES TO PHQ QUESTIONS 1-9: 0

## 2023-05-22 NOTE — LETTER
5/22/2023      RE: Chencho Beckman  [address] [place] [address]     Dear Colleague,    Thank you for referring your patient, Chencho Beckman, to the [facility] SP[facility]TS [facility]ICINE [facility]. Please see a copy of my visit note below.      [facility] CLINICS AND SURGERY CENTER  SPORTS & ORTHOPEDIC CLINIC VISIT     May 22, 2023        ASSESSMENT & PLAN    27-year-old with medial epicondylitis that is considerably improved.  Nearly resolved on the left.  Still having some persistent symptoms on the right but about 50% improved from prior    Reviewed imaging and assessment with patient in detail  We discussed continuing with his hand therapy exercises.  Discussed gradually returning to physical activity as well as jujitsu activities.  If he experiences repeated recurrences or his improvement stalls would recommend follow-up.  May consider imaging at that time.    Barber Calderón MD  [facility] SPORTS [facility]IC[facility]    -----  Chief Complaint   Patient presents with    Left Elbow - Follow Up    Right Elbow - Follow Up       SUBJECTIVE  Chencho Beckman is a/an 27 year old male who is seen for follow up of bilateral medial epicondylitis.     The patient is seen by themselves.    Date of injury: 2022  Date of Last Visit: 4/11/23   Symptoms: improved  Worsened by: Extended, pull ups, sled drag   Better with: Avoidance, brace   Treatment to date: Brace, some exercises at home,   Associated symptoms: no distal numbness or tingling; denies swelling or warmth        REVIEW OF SYSTEMS:  See HPI     OBJECTIVE:  There were no vitals taken for this visit.     General: Alert, pleasant, no distress  Right elbow: warm, well perfused, SILT throughout     Inspection: No swelling ecchymosis or deformity     ROM: Symmetric without pain     Strength: Intact with slight discomfort on wrist flexion against resistance.     Palpation: Mild TTP on the proximal side of the medial epicondyle.  No TTP in the  cubital tunnel.     Special Tests: None      RADIOLOGY:    No imaging this visit          Again, thank you for allowing me to participate in the care of your patient.      Sincerely,    Barber Calderón MD

## 2023-05-22 NOTE — PROGRESS NOTES
[facility] CLINICS AND SURGERY CENTER  SPORTS & ORTHOPEDIC CLINIC VISIT     May 22, 2023        ASSESSMENT & PLAN    27-year-old with medial epicondylitis that is considerably improved.  Nearly resolved on the left.  Still having some persistent symptoms on the right but about 50% improved from prior    Reviewed imaging and assessment with patient in detail  We discussed continuing with his hand therapy exercises.  Discussed gradually returning to physical activity as well as jujitsu activities.  If he experiences repeated recurrences or his improvement stalls would recommend follow-up.  May consider imaging at that time.    Barber Calderón MD  [facility] SPORTS MEDICINE CLINIC [place]    -----  Chief Complaint   Patient presents with     Left Elbow - Follow Up     Right Elbow - Follow Up       SUBJECTIVE  Chencho Beckman is a/an 27 year old male who is seen for follow up of bilateral medial epicondylitis.     The patient is seen by themselves.    Date of injury: 2022  Date of Last Visit: 4/11/23   Symptoms: improved  Worsened by: Extended, pull ups, sled drag   Better with: Avoidance, brace   Treatment to date: Brace, some exercises at home,   Associated symptoms: no distal numbness or tingling; denies swelling or warmth        REVIEW OF SYSTEMS:    See HPI     OBJECTIVE:  There were no vitals taken for this visit.     General: Alert, pleasant, no distress  Right elbow: warm, well perfused, SILT throughout     Inspection: No swelling ecchymosis or deformity     ROM: Symmetric without pain     Strength: Intact with slight discomfort on wrist flexion against resistance.     Palpation: Mild TTP on the proximal side of the medial epicondyle.  No TTP in the cubital tunnel.     Special Tests: None      RADIOLOGY:    No imaging this visit

## 2023-07-20 DIAGNOSIS — M25.561 RIGHT KNEE PAIN: Primary | ICD-10-CM

## 2023-07-21 NOTE — TELEPHONE ENCOUNTER
DIAGNOSIS: Event: Bent Knee, external hip rotation; vargus stress; ankle pulled up relative to frontal plane; pop/crunch - ALC?   APPOINTMENT DATE: 07/24/23   NOTES STATUS DETAILS   OFFICE NOTE from other specialist Internal 05/22/23, 04/11/23:  - Barber Calderón MD (elbow pain)

## 2023-07-24 ENCOUNTER — OFFICE VISIT (OUTPATIENT)
Dept: ORTHOPEDICS | Facility: CLINIC | Age: 28
End: 2023-07-24
Payer: OTHER GOVERNMENT

## 2023-07-24 ENCOUNTER — ANCILLARY PROCEDURE (OUTPATIENT)
Dept: GENERAL RADIOLOGY | Facility: CLINIC | Age: 28
End: 2023-07-24
Attending: FAMILY MEDICINE
Payer: OTHER GOVERNMENT

## 2023-07-24 ENCOUNTER — PRE VISIT (OUTPATIENT)
Dept: ORTHOPEDICS | Facility: CLINIC | Age: 28
End: 2023-07-24

## 2023-07-24 ENCOUNTER — TELEPHONE (OUTPATIENT)
Dept: ORTHOPEDICS | Facility: CLINIC | Age: 28
End: 2023-07-24

## 2023-07-24 DIAGNOSIS — M25.561 RIGHT KNEE PAIN: ICD-10-CM

## 2023-07-24 DIAGNOSIS — S83.421A SPRAIN OF LATERAL COLLATERAL LIGAMENT OF RIGHT KNEE, INITIAL ENCOUNTER: ICD-10-CM

## 2023-07-24 DIAGNOSIS — S89.91XA RIGHT KNEE INJURY, INITIAL ENCOUNTER: Primary | ICD-10-CM

## 2023-07-24 PROCEDURE — 99213 OFFICE O/P EST LOW 20 MIN: CPT | Performed by: FAMILY MEDICINE

## 2023-07-24 PROCEDURE — 73562 X-RAY EXAM OF KNEE 3: CPT | Mod: RT | Performed by: RADIOLOGY

## 2023-07-24 NOTE — TELEPHONE ENCOUNTER
[facility] Call Center    Phone Message    May a detailed message be left on voicemail: yes     Reason for Call: Other: Patient has appointment today with Dr. Rocha for what patient thinks might be ACL injury. He is calling because he noticed an xray was scheduled and wondered if a ct scan might be more helpful. Please contact patient back at [phone].      Action Taken: Message routed to:  Clinics [facility] Surgery Center ([facility]): [id number]    Travel Screening: Not Applicable

## 2023-07-24 NOTE — PROGRESS NOTES
Sports Medicine Clinic Visit    PCP: Charles David CARLOS Beckman is a 28 year old male who is seen as self referral presenting with right knee pain    Injury: Pt was participating in [employer] and was in the process of grappling, had his hip externally rotated and flexed, and his knee flexed in a varus moment, causing pain over his lateral knee.  He indicates that he did notice a sharp sensation within the knee at the time of the incident, as well as some swelling that evening, which has gone on to improve.  Indicates knee function is also improved in the past week.  He is not having weightbearing pain currently.  No luis locking.  He does not notice consistent instability sensation.  He denies previous issues with his right knee.        Location of Pain: right lateral knee  Duration of Pain: 16 day(s)  Rating of Pain: 2/10  Pain is better with: slow increase in activity, ice baths, HEP  Pain is worse with: Stairs, lateral movement  Additional Features: mild swelling laterally  Treatment so far consists of: slow increase in activity, ice baths, HEP  Prior History of related problems: No previous issues    There were no vitals taken for this visit.        Patient has been involved in [employer].  Patient has had an office job.  Has been in [employer].      PMH:  Past history of depression, treated with Zoloft.    Active problem list:  There is no problem list on file for this patient.      FH:  Cardiovascular Disease Maternal Grandfather       SH:  Social History     Socioeconomic History    Marital status: Single     Spouse name: Not on file    Number of children: Not on file    Years of education: Not on file    Highest education level: Not on file   Occupational History    Not on file   Tobacco Use    Smoking status: Never    Smokeless tobacco: Never   Substance and Sexual Activity    Alcohol use: Not Currently     Comment: Rare    Drug use: Never    Sexual activity: Not  Currently     Partners: Female     Birth control/protection: Pull-out method, Condom   Other Topics Concern    Parent/sibling w/ CABG, MI or angioplasty before 65F 55M? No   Social History Narrative    Not on file     Social Determinants of Health     Financial Resource Strain: Not on file   Food Insecurity: Not on file   Transportation Needs: Not on file   Physical Activity: Not on file   Stress: Not on file   Social Connections: Not on file   Intimate Partner Violence: Not on file   Housing Stability: Not on file       MEDS:  See EMR, reviewed  ALL:  See EMR, reviewed    REVIEW OF SYSTEMS:  CONSTITUTIONAL:NEGATIVE for fever, chills, change in weight  INTEGUMENTARY/SKIN: NEGATIVE for worrisome rashes, moles or lesions  EYES: NEGATIVE for vision changes or irritation  ENT/MOUTH: NEGATIVE for ear, mouth and throat problems  RESP:NEGATIVE for significant cough or SOB  BREAST: NEGATIVE for masses, tenderness or discharge  CV: NEGATIVE for chest pain, palpitations or peripheral edema  GI: NEGATIVE for nausea, abdominal pain, heartburn, or change in bowel habits  :NEGATIVE for frequency, dysuria, or hematuria  :NEGATIVE for frequency, dysuria, or hematuria  NEURO: NEGATIVE for weakness, dizziness or paresthesias  ENDOCRINE: NEGATIVE for temperature intolerance, skin/hair changes  HEME/ALLERGY/IMMUNE: NEGATIVE for bleeding problems  PSYCHIATRIC: NEGATIVE for changes in mood or affect      Objective: The right knee reveals no effusion.  He can do an active straight leg raise with no extension lag.  I can flex and extend the knee fully.  He is tender over the origin and insertion of the LCL and stressing of the LCL at 30 degrees is associated with discomfort but no specific laxity.  Stressing of the LCL at 0 degrees is not associated with discomfort or laxity.  He is nontender with internal rotation of the lower extremity and stressing of the posterior lateral corner.  No posterior lateral instability is noted.  PCL and  ACL instability tests are without signs of laxity.  Nontender over the medial joint line.  MCL stress testing is negative.  Overlying skin is normal.  Appropriate conversation and affect.      I personally reviewed with the patient x-rays of the right knee that show no bony abnormality, no fracture, no degenerative change.    Assessment: Right-sided acute knee injury x2 weeks, suspect LCL sprain, improving.  Possible underlying lateral meniscus tear.    Plan: We agreed to have a phone conversation in 4 to 6 weeks.  If he is not continuing to improve, an MRI of the knee may be useful at that point to rule out underlying lateral meniscus tear.  We discussed exercises to consider in the meantime and those to avoid.  Follow-up phone conversation in 4 to 6 weeks.

## 2023-07-24 NOTE — TELEPHONE ENCOUNTER
I returned the patient's phone call to let them know that if he did have an injury we will start with an xray to rule out a bony injury and on evaluation, if we are concerned about an ACL injury Dr. Rocha would then order a MRI for him. He understood, all questions were answered at this time. Radhika Pemberton, GRACIA on 7/24/2023 at 9:37 AM

## 2023-07-24 NOTE — LETTER
7/24/2023      RE: Chencho Beckman  [address] [place] [address]     Dear Colleague,    Thank you for referring your patient, Chencho Beckman, to the [facility] [place]. Please see a copy of my visit note below.    Sports Medicine Clinic Visit    PCP: Charles David    Chencho Beckman is a 28 year old male who is seen as self referral presenting with right knee pain    Injury: Pt was participating in [employer] and was in the process of grappling, had his hip externally rotated and flexed, and his knee flexed in a varus moment, causing pain over his lateral knee.  He indicates that he did notice a sharp sensation within the knee at the time of the incident, as well as some swelling that evening, which has gone on to improve.  Indicates knee function is also improved in the past week.  He is not having weightbearing pain currently.  No luis locking.  He does not notice consistent instability sensation.  He denies previous issues with his right knee.        Location of Pain: right lateral knee  Duration of Pain: 16 day(s)  Rating of Pain: 2/10  Pain is better with: slow increase in activity, ice baths, HEP  Pain is worse with: Stairs, lateral movement  Additional Features: mild swelling laterally  Treatment so far consists of: slow increase in activity, ice baths, HEP  Prior History of related problems: No previous issues    There were no vitals taken for this visit.        Patient has been involved in Cardio control.  Patient has had an office job.  Has been in [employer].      PMH:  Past history of depression, treated with Zoloft.    Active problem list:  There is no problem list on file for this patient.      FH:  Cardiovascular Disease Maternal Grandfather       SH:  Social History     Socioeconomic History    Marital status: Single     Spouse name: Not on file    Number of children: Not on file    Years of education: Not on file    Highest  education level: Not on file   Occupational History    Not on file   Tobacco Use    Smoking status: Never    Smokeless tobacco: Never   Substance and Sexual Activity    Alcohol use: Not Currently     Comment: Rare    Drug use: Never    Sexual activity: Not Currently     Partners: Female     Birth control/protection: Pull-out method, Condom   Other Topics Concern    Parent/sibling w/ CABG, MI or angioplasty before 65F 55M? No   Social History Narrative    Not on file     Social Determinants of Health     Financial Resource Strain: Not on file   Food Insecurity: Not on file   Transportation Needs: Not on file   Physical Activity: Not on file   Stress: Not on file   Social Connections: Not on file   Intimate Partner Violence: Not on file   Housing Stability: Not on file       MEDS:  See EMR, reviewed  ALL:  See EMR, reviewed    REVIEW OF SYSTEMS:  CONSTITUTIONAL:NEGATIVE for fever, chills, change in weight  INTEGUMENTARY/SKIN: NEGATIVE for worrisome rashes, moles or lesions  EYES: NEGATIVE for vision changes or irritation  ENT/MOUTH: NEGATIVE for ear, mouth and throat problems  RESP:NEGATIVE for significant cough or SOB  BREAST: NEGATIVE for masses, tenderness or discharge  CV: NEGATIVE for chest pain, palpitations or peripheral edema  GI: NEGATIVE for nausea, abdominal pain, heartburn, or change in bowel habits  :NEGATIVE for frequency, dysuria, or hematuria  :NEGATIVE for frequency, dysuria, or hematuria  NEURO: NEGATIVE for weakness, dizziness or paresthesias  ENDOCRINE: NEGATIVE for temperature intolerance, skin/hair changes  HEME/ALLERGY/IMMUNE: NEGATIVE for bleeding problems  PSYCHIATRIC: NEGATIVE for changes in mood or affect      Objective: The right knee reveals no effusion.  He can do an active straight leg raise with no extension lag.  I can flex and extend the knee fully.  He is tender over the origin and insertion of the LCL and stressing of the LCL at 30 degrees is associated with discomfort but no  specific laxity.  Stressing of the LCL at 0 degrees is not associated with discomfort or laxity.  He is nontender with internal rotation of the lower extremity and stressing of the posterior lateral corner.  No posterior lateral instability is noted.  PCL and ACL instability tests are without signs of laxity.  Nontender over the medial joint line.  MCL stress testing is negative.  Overlying skin is normal.  Appropriate conversation and affect.      I personally reviewed with the patient x-rays of the right knee that show no bony abnormality, no fracture, no degenerative change.    Assessment: Right-sided acute knee injury x2 weeks, suspect LCL sprain, improving.  Possible underlying lateral meniscus tear.    Plan: We agreed to have a phone conversation in 4 to 6 weeks.  If he is not continuing to improve, an MRI of the knee may be useful at that point to rule out underlying lateral meniscus tear.  We discussed exercises to consider in the meantime and those to avoid.  Follow-up phone conversation in 4 to 6 weeks.        Again, thank you for allowing me to participate in the care of your patient.      Sincerely,    Fidencio Rocha MD

## 2023-08-21 ENCOUNTER — VIRTUAL VISIT (OUTPATIENT)
Dept: ORTHOPEDICS | Facility: CLINIC | Age: 28
End: 2023-08-21
Payer: OTHER GOVERNMENT

## 2023-08-21 DIAGNOSIS — M25.561 ACUTE PAIN OF RIGHT KNEE: Primary | ICD-10-CM

## 2023-08-21 PROCEDURE — 99207 PR NO CHARGE LOS: CPT | Performed by: FAMILY MEDICINE

## 2023-08-21 NOTE — LETTER
8/21/2023       RE: Chencho Beckman  [address] [place] [address]     Dear Colleague,    Thank you for referring your patient, Chencho Beckman, to the [facility]. Please see a copy of my visit note below.    Pt no show.      Again, thank you for allowing me to participate in the care of your patient.      Sincerely,    Fidencio Rocha MD

## 2023-10-05 ENCOUNTER — OFFICE VISIT (OUTPATIENT)
Dept: FAMILY MEDICINE | Facility: CLINIC | Age: 28
End: 2023-10-05
Payer: OTHER GOVERNMENT

## 2023-10-05 VITALS
OXYGEN SATURATION: 98 % | SYSTOLIC BLOOD PRESSURE: 118 MMHG | BODY MASS INDEX: 24.17 KG/M2 | HEART RATE: 76 BPM | RESPIRATION RATE: 18 BRPM | TEMPERATURE: 97.9 F | HEIGHT: 64 IN | DIASTOLIC BLOOD PRESSURE: 60 MMHG | WEIGHT: 141.6 LBS

## 2023-10-05 DIAGNOSIS — R45.89 ANXIETY ABOUT HEALTH: ICD-10-CM

## 2023-10-05 DIAGNOSIS — Z81.8 FAMILY HISTORY OF MAJOR DEPRESSION: Primary | ICD-10-CM

## 2023-10-05 DIAGNOSIS — M77.9 TENDONITIS: ICD-10-CM

## 2023-10-05 DIAGNOSIS — Z82.49 FAMILY HISTORY OF ASCVD (ARTERIOSCLEROTIC CARDIOVASCULAR DISEASE): ICD-10-CM

## 2023-10-05 LAB
ALBUMIN SERPL BCG-MCNC: 4.6 G/DL (ref 3.5–5.2)
ALP SERPL-CCNC: 59 U/L (ref 40–129)
ALT SERPL W P-5'-P-CCNC: 46 U/L (ref 0–70)
ANION GAP SERPL CALCULATED.3IONS-SCNC: 10 MMOL/L (ref 7–15)
AST SERPL W P-5'-P-CCNC: 42 U/L (ref 0–45)
BILIRUB SERPL-MCNC: 0.5 MG/DL
BUN SERPL-MCNC: 12.7 MG/DL (ref 6–20)
CALCIUM SERPL-MCNC: 9.6 MG/DL (ref 8.6–10)
CHLORIDE SERPL-SCNC: 102 MMOL/L (ref 98–107)
CHOLEST SERPL-MCNC: 213 MG/DL
CREAT SERPL-MCNC: 1.06 MG/DL (ref 0.67–1.17)
DEPRECATED HCO3 PLAS-SCNC: 27 MMOL/L (ref 22–29)
EGFRCR SERPLBLD CKD-EPI 2021: >90 ML/MIN/1.73M2
ERYTHROCYTE [DISTWIDTH] IN BLOOD BY AUTOMATED COUNT: 11.7 % (ref 10–15)
GLUCOSE SERPL-MCNC: 72 MG/DL (ref 70–99)
HBA1C MFR BLD: 4.9 % (ref 0–5.6)
HCT VFR BLD AUTO: 45.7 % (ref 40–53)
HDLC SERPL-MCNC: 76 MG/DL
HGB BLD-MCNC: 15.9 G/DL (ref 13.3–17.7)
LDLC SERPL CALC-MCNC: 114 MG/DL
MCH RBC QN AUTO: 32.1 PG (ref 26.5–33)
MCHC RBC AUTO-ENTMCNC: 34.8 G/DL (ref 31.5–36.5)
MCV RBC AUTO: 92 FL (ref 78–100)
NONHDLC SERPL-MCNC: 137 MG/DL
PLATELET # BLD AUTO: 225 10E3/UL (ref 150–450)
POTASSIUM SERPL-SCNC: 4.4 MMOL/L (ref 3.4–5.3)
PROT SERPL-MCNC: 7 G/DL (ref 6.4–8.3)
RBC # BLD AUTO: 4.95 10E6/UL (ref 4.4–5.9)
SHBG SERPL-SCNC: 45 NMOL/L (ref 11–80)
SODIUM SERPL-SCNC: 139 MMOL/L (ref 135–145)
TRIGL SERPL-MCNC: 114 MG/DL
TSH SERPL DL<=0.005 MIU/L-ACNC: 1.36 UIU/ML (ref 0.3–4.2)
WBC # BLD AUTO: 4.5 10E3/UL (ref 4–11)

## 2023-10-05 PROCEDURE — 84403 ASSAY OF TOTAL TESTOSTERONE: CPT | Performed by: NURSE PRACTITIONER

## 2023-10-05 PROCEDURE — 80061 LIPID PANEL: CPT | Performed by: NURSE PRACTITIONER

## 2023-10-05 PROCEDURE — 99214 OFFICE O/P EST MOD 30 MIN: CPT | Performed by: NURSE PRACTITIONER

## 2023-10-05 PROCEDURE — 80053 COMPREHEN METABOLIC PANEL: CPT | Performed by: NURSE PRACTITIONER

## 2023-10-05 PROCEDURE — 84443 ASSAY THYROID STIM HORMONE: CPT | Performed by: NURSE PRACTITIONER

## 2023-10-05 PROCEDURE — 36415 COLL VENOUS BLD VENIPUNCTURE: CPT | Performed by: NURSE PRACTITIONER

## 2023-10-05 PROCEDURE — 85027 COMPLETE CBC AUTOMATED: CPT | Performed by: NURSE PRACTITIONER

## 2023-10-05 PROCEDURE — 83036 HEMOGLOBIN GLYCOSYLATED A1C: CPT | Performed by: NURSE PRACTITIONER

## 2023-10-05 PROCEDURE — 84270 ASSAY OF SEX HORMONE GLOBUL: CPT | Performed by: NURSE PRACTITIONER

## 2023-10-05 NOTE — PROGRESS NOTES
"  {PROVIDER CHARTING PREFERENCE:690063}    Yoan Crawford is a 28 year old, presenting for the following health issues:  Follow Up (Blood work, and sleep issue.)      10/5/2023     8:03 AM   Additional Questions   Roomed by lc-lpn   Accompanied by na         10/5/2023     8:03 AM   Patient Reported Additional Medications   Patient reports taking the following new medications na       [unfilled]     {MA/LPN/RN Pre-Provider Visit Orders- hCG/UA/Strep (Optional):315234}  {SUPERLIST (Optional):699013}  {additonal problems for provider to add (Optional):453449}      [unfilled]   {ROS COMP (Optional):359242}      Objective    /60 (BP Location: Right arm, Patient Position: Sitting, Cuff Size: Adult Regular)   Pulse 76   Temp 97.9  F (36.6  C) (Oral)   Resp 18   Ht 1.626 m (5' 4\")   Wt 64.2 kg (141 lb 9.6 oz)   SpO2 98%   BMI 24.31 kg/m    Body mass index is 24.31 kg/m .  [unfilled]   {Exam List (Optional):692944}    {Diagnostic Test Results (Optional):541943}    {AMBULATORY ATTESTATION (Optional):078232}              {PROVIDER CHARTING PREFERENCE:418152}    Yoan Crawford is a 28 year old, presenting for the following health issues:  Follow Up (Blood work, and sleep issue.)      10/5/2023     8:03 AM   Additional Questions   Roomed by lc-lpn   Accompanied by na         10/5/2023     8:03 AM   Patient Reported Additional Medications   Patient reports taking the following new medications na       History of Present Illness       Mental Health Follow-up:  Patient presents to follow-up on Depression.Patient's depression since last visit has been:  No change  The patient is having other symptoms associated with depression.      Any significant life events: No  Patient is not feeling anxious or having panic attacks.  Patient has no concerns about alcohol or drug use.    Reason for visit:  Comprehensive Blood Testing  Symptom onset:  More than a month  Symptoms include:  Fatigue, alexithymia, depression, " "sleep, joint pain  Symptom intensity:  Moderate  Symptom progression:  Staying the same  Had these symptoms before:  Yes  Has tried/received treatment for these symptoms:  No  What makes it better:  Post Ice Baths; Post Exercise; prolonged sunlight/outside exposure    He eats 0-1 servings of fruits and vegetables daily.He consumes 0 sweetened beverage(s) daily.He exercises with enough effort to increase his heart rate 20 to 29 minutes per day.  He exercises with enough effort to increase his heart rate 5 days per week.        {MA/LPN/RN Pre-Provider Visit Orders- hCG/UA/Strep (Optional):800538}  {SUPERLIST (Optional):533260}  {additonal problems for provider to add (Optional):720142}      Review of Systems   {ROS COMP (Optional):973556}      Objective    /60 (BP Location: Right arm, Patient Position: Sitting, Cuff Size: Adult Regular)   Pulse 76   Temp 97.9  F (36.6  C) (Oral)   Resp 18   Ht 1.626 m (5' 4\")   Wt 64.2 kg (141 lb 9.6 oz)   SpO2 98%   BMI 24.31 kg/m    Body mass index is 24.31 kg/m .  Physical Exam   {Exam List (Optional):703575}    {Diagnostic Test Results (Optional):347687}    {AMBULATORY ATTESTATION (Optional):993195}              "

## 2023-10-05 NOTE — PROGRESS NOTES
Assessment & Plan     Ty is a 28-year-old male who comes in today requesting labs and further discussion about his baseline health.  He is anxious to do several labs which she has researched online and determine he would like to have done despite insurance possibly not Covering these these labs.  His grandfather does have history of coronary artery disease.  Discussed the option for CT calcium scan and discussed that this would be an out-of-pocket cost based on his low risk factors and age.  He is not fasting today as he feels that he wants his labs done when he is in the middle of an average day.    Discussed with Ty that many of the labs being performed today often change and may not be indicative of current infection or disease however they are good baselines and reflex labs can be ordered if there is anything abnormal.  I do recommend that he be seen by on herbalist or an integrative medicine specialist as he may find that to be more helpful.  He had multiple questions today all of which we were able to answer and discuss.    Spent 40mins doing chart review, history and exam, patient education, documentation and further activities per the note.    Family history of major depression  - TSH with free T4 reflex  - TSH with free T4 reflex    Tendonitis  No current pain - continue HEP    Anxiety about health  - CBC with platelets  - Comprehensive metabolic panel (BMP + Alb, Alk Phos, ALT, AST, Total. Bili, TP)  - CBC with platelets  - Comprehensive metabolic panel (BMP + Alb, Alk Phos, ALT, AST, Total. Bili, TP)    Family history of ASCVD (arteriosclerotic cardiovascular disease)  - Lipid Profile (Chol, Trig, HDL, LDL calc)  - Testosterone Free and Total  - Hemoglobin A1c  - Lipid Profile (Chol, Trig, HDL, LDL calc)  - Testosterone Free and Total  - Hemoglobin A1c                   SUMI Hernandez [facility]    Subjective   Ty is a 28 year old, presenting for the  following health issues:  Follow Up (Blood work, and sleep issue.)      10/5/2023     8:03 AM   Additional Questions   Roomed by lc-lpn   Accompanied by oli         10/5/2023     8:03 AM   Patient Reported Additional Medications   Patient reports taking the following new medications na       History of Present Illness       Mental Health Follow-up:  Patient presents to follow-up on Depression.Patient's depression since last visit has been:  No change  The patient is having other symptoms associated with depression.      Any significant life events: No  Patient is not feeling anxious or having panic attacks.  Patient has no concerns about alcohol or drug use.    Reason for visit:  Comprehensive Blood Testing  Symptom onset:  More than a month  Symptoms include:  Fatigue, alexithymia, depression, sleep, joint pain  Symptom intensity:  Moderate  Symptom progression:  Staying the same  Had these symptoms before:  Yes  Has tried/received treatment for these symptoms:  No  What makes it better:  Post Ice Baths; Post Exercise; prolonged sunlight/outside exposure    He eats 0-1 servings of fruits and vegetables daily.He consumes 0 sweetened beverage(s) daily.He exercises with enough effort to increase his heart rate 20 to 29 minutes per day.  He exercises with enough effort to increase his heart rate 5 days per week.      Sleep concerns - lives in basement - sewage was leaking above the toilet from his   He wants to know what his overall health is and wanting to know labs:  Heavy metal, cardiovascular, testosterone free, metabolic, Lipids, SHBG,   Thyroid markers,  hematocrit,     Occasionally has chest pain -starts in the back and radiates to the pectoral area BL and go down back and feels crushing and random. Mostly occurred at rest usually after eating.   Paternal grandfather with     Was going to go through [employer] and     Ate sourcrout, coffee,and brownie            Review of Systems   Constitutional, HEENT,  "cardiovascular, pulmonary, gi and gu systems are negative, except as otherwise noted.      Objective    /60 (BP Location: Right arm, Patient Position: Sitting, Cuff Size: Adult Regular)   Pulse 76   Temp 97.9  F (36.6  C) (Oral)   Resp 18   Ht 1.626 m (5' 4\")   Wt 64.2 kg (141 lb 9.6 oz)   SpO2 98%   BMI 24.31 kg/m    Body mass index is 24.31 kg/m .  Physical Exam   GENERAL: healthy, alert   NECK: no adenopathy, no asymmetry, masses  RESP: lungs clear to auscultation - no rales, rhonchi or wheezes  CV: regular rate and rhythm, normal S1 S2, no S3 or S4, no murmur, click or rub, no peripheral edema  MS: no gross musculoskeletal defects noted, no edema                      "

## 2023-10-06 NOTE — RESULT ENCOUNTER NOTE
Your cholesterol testing is mildly elevated though you are still at very low risk for a cardiovascular event.  Cholesterol levels have the possibility to be maintained with lifestyle modifications to lower cardiovascular disease risk. This includes eating a heart-healthy plant forward diet with more emphasis on vegetables, fruits & whole grains, regular aerobic exercises (30min-1hr daily), maintenance of desirable body weight and avoidance of alcohol, tobacco, and ultra-processed food products.    Let me know if you have questions on the other labs.     SUMI Hernandez CNP on 10/6/2023 at 12:24 AM

## 2023-10-09 LAB
TESTOST FREE SERPL-MCNC: 9.56 NG/DL
TESTOST SERPL-MCNC: 547 NG/DL (ref 240–950)

## 2024-03-09 ENCOUNTER — HEALTH MAINTENANCE LETTER (OUTPATIENT)
Age: 29
End: 2024-03-09

## 2024-12-31 ENCOUNTER — E-VISIT (OUTPATIENT)
Dept: FAMILY MEDICINE | Facility: CLINIC | Age: 29
End: 2024-12-31
Payer: OTHER GOVERNMENT

## 2024-12-31 DIAGNOSIS — R51.9 ACUTE NONINTRACTABLE HEADACHE, UNSPECIFIED HEADACHE TYPE: ICD-10-CM

## 2024-12-31 DIAGNOSIS — R69 DIAGNOSIS UNKNOWN: Primary | ICD-10-CM

## 2024-12-31 NOTE — TELEPHONE ENCOUNTER
Provider E-Visit time total (minutes): 8 minutes reviewing patient's previous lab work, there was no available imaging available.    Because of the described symptoms patient should be evaluated by emergency care or urgent care especially because he is having changes in his headache associated with positional changes as well as increased intracranial pressure like sneezing.

## 2024-12-31 NOTE — PATIENT INSTRUCTIONS
Thank you for choosing us for your care. Based on the information provided, I believe you need to be seen within the next day.  Please go the emergency department or urgent care as soon as possible.  The biggest concern I have is that you are having changes in your headache associate with intracranial pressure like sneezing or changes in position.    You will not be charged for this eVisit.    Thank you for choosing us for your care. Based on your symptoms and length of illness, I do not think that you need a prescription at this time.  Please follow the care advise I've provided and use the over the counter medications to help relieve your symptoms.   View your full visit summary for details by clicking on the link below.     If you're not feeling better within 2-3 days, please respond to this message and we can consider if a prescription is needed.  You can schedule an appointment right here in [place], or call [phone]  If the visit is for the same symptoms as your eVisit, we'll refund the cost of your eVisit if seen within seven days.      Headache: Care Instructions  Overview     Headaches have many possible causes. Most headaches aren't a sign of a more serious problem, and they will get better on their own. Home treatment may help you feel better faster.  The doctor has checked you carefully, but problems can develop later. If you notice any problems or new symptoms, get medical treatment right away.  Follow-up care is a key part of your treatment and safety. Be sure to make and go to all appointments, and call your doctor if you are having problems. It's also a good idea to know your test results and keep a list of the medicines you take.  How can you care for yourself at home?  Rest in a quiet, dark room until your headache is gone. Close your eyes and try to relax or go to sleep. Don't watch TV or read.  Put a cold, moist cloth or cold pack on the painful area for 10 to 20 minutes at a time. Put a thin  cloth between the cold pack and your skin.  Use a warm, moist towel or a heating pad set on low to relax tight shoulder and neck muscles.  Have someone gently massage your neck and shoulders.  Take pain medicines exactly as directed.  If the doctor gave you a prescription medicine for pain, take it as prescribed.  If you are not taking a prescription pain medicine, ask your doctor if you can take an over-the-counter medicine.  Do not ignore new symptoms that occur with a headache, such as a fever, weakness or numbness, vision changes, or confusion. These may be signs of a more serious problem.  To prevent headaches  Keep a headache diary so you can figure out what triggers your headaches. Avoiding triggers may help you prevent headaches. Record when each headache began, how long it lasted, and what the pain was like (throbbing, aching, stabbing, or dull). Write down any other symptoms you had with the headache, such as nausea, flashing lights or dark spots, or sensitivity to bright light or loud noise. Note if the headache occurred near your period. List anything that might have triggered the headache, such as certain foods (chocolate, cheese, wine) or odors, smoke, bright light, stress, or lack of sleep.  Find healthy ways to deal with stress. Headaches are most common during or right after stressful times. Take time to relax before and after you do something that has caused a headache in the past.  Try to keep your muscles relaxed by keeping good posture. Check your jaw, face, neck, and shoulder muscles for tension, and try relaxing them. When sitting at a desk, change positions often, and stretch for 30 seconds each hour.  Get plenty of sleep and exercise.  Eat regularly. Long periods without food can trigger a headache.  Limit caffeine by not drinking too much coffee, tea, or soda. But don't quit caffeine suddenly, because that can also give you headaches.  Reduce eyestrain from computers by blinking frequently  "and looking away from the computer screen every so often. Make sure you have proper eyewear and that your monitor is set up properly, about an arm's length away.  When should you call for help?   Call 911 anytime you think you may need emergency care. For example, call if:    You have signs of a stroke. These may include:  Sudden numbness, paralysis, or weakness in your face, arm, or leg, especially on only one side of your body.  Sudden vision changes.  Sudden trouble speaking.  Sudden confusion or trouble understanding simple statements.  Sudden problems with walking or balance.  A sudden, severe headache that is different from past headaches.   Call your doctor now or seek immediate medical care if:    You have a fever and a stiff neck.     You have new nausea and vomiting, or you cannot keep down food or fluids.     Your headache gets much worse.   Watch closely for changes in your health, and be sure to contact your doctor if:    Your headaches get worse, happen more often, or change in some way.     You have new symptoms.     Your life is disrupted by your headaches. For example, you often miss work, school, or other activities.     You do not get better as expected.   Where can you learn more?  Go to https://www.[employer]/patiented  Enter M271 in the search box to learn more about \"Headache: Care Instructions.\"  Current as of: December 20, 2023  Content Version: 14.3    2024 [employer].   Care instructions adapted under license by your healthcare professional. If you have questions about a medical condition or this instruction, always ask your healthcare professional. [employer] disclaims any warranty or liability for your use of this information.    "

## 2025-03-16 ENCOUNTER — HEALTH MAINTENANCE LETTER (OUTPATIENT)
Age: 30
End: 2025-03-16